# Patient Record
Sex: MALE | Race: WHITE | NOT HISPANIC OR LATINO | Employment: UNEMPLOYED | URBAN - METROPOLITAN AREA
[De-identification: names, ages, dates, MRNs, and addresses within clinical notes are randomized per-mention and may not be internally consistent; named-entity substitution may affect disease eponyms.]

---

## 2017-07-22 ENCOUNTER — HOSPITAL ENCOUNTER (EMERGENCY)
Facility: CLINIC | Age: 27
Discharge: HOME | End: 2017-07-23
Attending: EMERGENCY MEDICINE | Admitting: EMERGENCY MEDICINE
Payer: COMMERCIAL

## 2017-07-22 DIAGNOSIS — R19.7 DIARRHEA OF PRESUMED INFECTIOUS ORIGIN: ICD-10-CM

## 2017-07-22 DIAGNOSIS — K52.9 NON-SPECIFIC COLITIS: ICD-10-CM

## 2017-07-22 LAB
ANION GAP SERPL CALCULATED.3IONS-SCNC: 12 MMOL/L (ref 3–14)
BUN SERPL-MCNC: 7 MG/DL (ref 7–30)
CALCIUM SERPL-MCNC: 8.8 MG/DL (ref 8.5–10.1)
CHLORIDE SERPL-SCNC: 102 MMOL/L (ref 94–109)
CO2 SERPL-SCNC: 24 MMOL/L (ref 20–32)
CREAT SERPL-MCNC: 0.78 MG/DL (ref 0.66–1.25)
GFR SERPL CREATININE-BSD FRML MDRD: ABNORMAL ML/MIN/1.7M2
GLUCOSE SERPL-MCNC: 127 MG/DL (ref 70–99)
POTASSIUM SERPL-SCNC: 3.8 MMOL/L (ref 3.4–5.3)
SODIUM SERPL-SCNC: 138 MMOL/L (ref 133–144)

## 2017-07-22 PROCEDURE — 99283 EMERGENCY DEPT VISIT LOW MDM: CPT | Mod: 25 | Performed by: EMERGENCY MEDICINE

## 2017-07-22 PROCEDURE — 87506 IADNA-DNA/RNA PROBE TQ 6-11: CPT | Performed by: EMERGENCY MEDICINE

## 2017-07-22 PROCEDURE — 80048 BASIC METABOLIC PNL TOTAL CA: CPT | Performed by: FAMILY MEDICINE

## 2017-07-22 PROCEDURE — 96360 HYDRATION IV INFUSION INIT: CPT | Performed by: EMERGENCY MEDICINE

## 2017-07-22 PROCEDURE — 25000128 H RX IP 250 OP 636: Performed by: FAMILY MEDICINE

## 2017-07-22 PROCEDURE — 87493 C DIFF AMPLIFIED PROBE: CPT | Performed by: EMERGENCY MEDICINE

## 2017-07-22 PROCEDURE — 99282 EMERGENCY DEPT VISIT SF MDM: CPT | Mod: GC | Performed by: EMERGENCY MEDICINE

## 2017-07-22 RX ORDER — SODIUM CHLORIDE 9 MG/ML
1000 INJECTION, SOLUTION INTRAVENOUS CONTINUOUS
Status: DISCONTINUED | OUTPATIENT
Start: 2017-07-22 | End: 2017-07-23 | Stop reason: HOSPADM

## 2017-07-22 RX ADMIN — SODIUM CHLORIDE 1000 ML: 9 INJECTION, SOLUTION INTRAVENOUS at 23:30

## 2017-07-22 RX ADMIN — SODIUM CHLORIDE 1000 ML: 9 INJECTION, SOLUTION INTRAVENOUS at 22:37

## 2017-07-22 ASSESSMENT — ENCOUNTER SYMPTOMS
DIAPHORESIS: 1
VOMITING: 0
NECK STIFFNESS: 0
COLOR CHANGE: 0
BLOOD IN STOOL: 0
ABDOMINAL PAIN: 1
EYE REDNESS: 0
DIFFICULTY URINATING: 0
APPETITE CHANGE: 1
ARTHRALGIAS: 0
HEADACHES: 1
CHILLS: 1
NAUSEA: 0
CONFUSION: 0
DIARRHEA: 1
FEVER: 1
SHORTNESS OF BREATH: 0

## 2017-07-22 NOTE — ED AVS SNAPSHOT
[facility], Emergency Department    [address]    [place] [address]    Phone:  [phone]    Fax:  [phone]                                       Balaji Wolfe   MRN: [id number]    Department:  [facility], Emergency Department   Date of Visit:  7/22/2017           Patient Information     Date Of Birth          1990        Your diagnoses for this visit were:     Non-specific colitis        You were seen by Lyubov Valladares MD.        Discharge Instructions       Please make an appointment to follow up with Your Primary Care Provider in 3-5 days if not improving.     Discharge References/Attachments     DIARRHEA, TREATING (ENGLISH)      24 Hour Appointment Hotline       To make an appointment at any [facility] clinic, call [phone] ([phone]). If you don't have a family doctor or clinic, we will help you find one. [facility] clinics are conveniently located to serve the needs of you and your family.             Review of your medicines      Our records show that you are taking the medicines listed below. If these are incorrect, please call your family doctor or clinic.        Dose / Directions Last dose taken    ADVIL 200 MG capsule   Generic drug:  ibuprofen        Take  by mouth as needed.   Refills:  0        EPINEPHrine 0.3 MG/0.3ML injection 2-pack   Commonly known as:  EPIPEN 2-NAS   Dose:  0.3 mg   Quantity:  0.6 mL        Inject 0.3 mLs (0.3 mg) into the muscle once as needed for anaphylaxis   Refills:  11        minocycline 100 MG tablet   Commonly known as:  DYNACIN   Dose:  1 tablet        Take 1 tablet by mouth 2 times daily. Per dermatology   Refills:  0                Procedures and tests performed during your visit     Basic metabolic panel    Clostridium difficile toxin B PCR    Enteric Bacteria and Virus Panel by SABINE Stool      Orders Needing Specimen Collection     None      Pending Results     Date and Time Order Name Status Description    7/22/2017  2204 Clostridium difficile toxin B PCR In process     7/22/2017 2204 Enteric Bacteria and Virus Panel by SABINE Stool In process             Pending Culture Results     Date and Time Order Name Status Description    7/22/2017 2204 Clostridium difficile toxin B PCR In process     7/22/2017 2204 Enteric Bacteria and Virus Panel by SABINE Stool In process             Pending Results Instructions     If you had any lab results that were not finalized at the time of your Discharge, you can call the ED Lab Result RN at [phone]. You will be contacted by this team for any positive Lab results or changes in treatment. The nurses are available 7 days a week from 10A to 6:30P.  You can leave a message 24 hours per day and they will return your call.        Thank you for choosing [facility]       Thank you for choosing [facility] for your care. Our goal is always to provide you with excellent care. Hearing back from our patients is one way we can continue to improve our services. Please take a few minutes to complete the written survey that you may receive in the mail after you visit with us. Thank you!        [employer] Information     [employer] gives you secure access to your electronic health record. If you see a primary care provider, you can also send messages to your care team and make appointments. If you have questions, please call your primary care clinic.  If you do not have a primary care provider, please call [phone] and they will assist you.        Care EveryWhere ID     This is your Care EveryWhere ID. This could be used by other organizations to access your [facility] medical records  [id number]        Equal Access to Services     ERIKA IN AH: Hadii soledad Pagan, waaxda luqadaha, qaybta kaalmada mildred, jose alberto wong. So [facility] [phone].    ATENCIÓN: Si habla español, tiene a hay disposición servicios gratuitos de asistencia lingüística. Llame al [phone].    We comply  with applicable federal civil rights laws and [place] laws. We do not discriminate on the basis of race, color, national origin, age, disability sex, sexual orientation or gender identity.            After Visit Summary       This is your record. Keep this with you and show to your community pharmacist(s) and doctor(s) at your next visit.

## 2017-07-22 NOTE — ED AVS SNAPSHOT
[facility], [place], Emergency Department    [address]    [facility] [address]    Phone:  [phone]    Fax:  [phone]                                       Balaji Wolfe   MRN: [id number]    Department:  [facility], Emergency Department   Date of Visit:  7/22/2017           After Visit Summary Signature Page     I have received my discharge instructions, and my questions have been answered. I have discussed any challenges I see with this plan with the nurse or doctor.    ..........................................................................................................................................  Patient/Patient Representative Signature      ..........................................................................................................................................  Patient Representative Print Name and Relationship to Patient    ..................................................               ................................................  Date                                            Time    ..........................................................................................................................................  Reviewed by Signature/Title    ...................................................              ..............................................  Date                                                            Time

## 2017-07-23 ENCOUNTER — TELEPHONE (OUTPATIENT)
Dept: EMERGENCY MEDICINE | Facility: CLINIC | Age: 27
End: 2017-07-23

## 2017-07-23 VITALS
HEART RATE: 99 BPM | DIASTOLIC BLOOD PRESSURE: 76 MMHG | TEMPERATURE: 98.8 F | OXYGEN SATURATION: 100 % | RESPIRATION RATE: 18 BRPM | SYSTOLIC BLOOD PRESSURE: 134 MMHG | BODY MASS INDEX: 20.02 KG/M2 | WEIGHT: 157 LBS

## 2017-07-23 LAB
C DIFF TOX B STL QL: NORMAL
CAMPYLOBACTER GROUP BY NAT: NOT DETECTED
ENTERIC PATHOGEN COMMENT: ABNORMAL
NOROVIRUS I AND II BY NAT: NOT DETECTED
ROTAVIRUS A BY NAT: NOT DETECTED
SALMONELLA SPECIES BY NAT: ABNORMAL
SHIGA TOXIN 1 GENE BY NAT: NOT DETECTED
SHIGA TOXIN 2 GENE BY NAT: NOT DETECTED
SHIGELLA SP+EIEC IPAH STL QL NAA+PROBE: NOT DETECTED
SPECIMEN SOURCE: NORMAL
VIBRIO GROUP BY NAT: NOT DETECTED
YERSINIA ENTEROCOLITICA BY NAT: NOT DETECTED

## 2017-07-23 NOTE — ED NOTES
Patient has been febrile since yesterday.  Also, he has severe LLQ pain and headache along with dizziness with positional changes.

## 2017-07-23 NOTE — DISCHARGE INSTRUCTIONS
Please make an appointment to follow up with Your Primary Care Provider in 3-5 days if not improving.

## 2017-07-23 NOTE — ED PROVIDER NOTES
"  History     Chief Complaint   Patient presents with     Fever     currently fever is 100.5 but patient did take 200mg of ibuprofen around 3pm today     Abdominal Pain     sharp abdominal pain which started yesterday - LLQ     Dizziness     with positional changes.     HPI  Balaji Wolfe is a 26 year old male who presents for 2 days of abdominal pain, fever, headache, and diarrhea. The patient describes his abdominal pain as a 6-7/10 cramping pain localized to the left lower quadrant that comes in waves. His diarrhea has not had any blood. He reports chills and sweats at home with a temp of 100.5 here in the ED. The patient has not had any cough, shortness of breath, nausea, or vomiting. He has had a decreased PO intake secondary to decreased appetite but is attempting to keep up on fluids. He feels lightheaded when he stands and has a mild diffuse headache. The patient denies any ill contacts and has never had a similar illness.     I have reviewed the Medications, Allergies, Past Medical and Surgical History, and Social History in the [facility] system.  Past Medical History:   Diagnosis Date     Attn deficit w/ hyperact        Past Surgical History:   Procedure Laterality Date     NOSE SURGERY      nasal fracture       Family History   Problem Relation Age of Onset     Lipids Father      Family History Negative Mother      Family History Negative Brother        Social History   Substance Use Topics     Smoking status: Current Some Day Smoker     Types: Cigarettes     Smokeless tobacco: Never Used      Comment: \"occasional\" smoker (~ 1 pack every 1-1.5 weeks)     Alcohol use 0.0 oz/week     0 Standard drinks or equivalent per week      Comment: social beer         Review of Systems   Constitutional: Positive for appetite change, chills, diaphoresis and fever.   HENT: Negative for congestion.    Eyes: Negative for redness.   Respiratory: Negative for shortness of breath.    Cardiovascular: Negative for chest pain. "   Gastrointestinal: Positive for abdominal pain and diarrhea. Negative for blood in stool, nausea and vomiting.   Genitourinary: Negative for difficulty urinating.   Musculoskeletal: Negative for arthralgias and neck stiffness.   Skin: Negative for color change.   Neurological: Positive for headaches.   Psychiatric/Behavioral: Negative for confusion.   All other systems reviewed and are negative.      Physical Exam   BP: 135/87  Pulse: 99  Temp: 100.5  F (38.1  C)  Resp: 18  Weight: 71.2 kg (157 lb)  SpO2: 99 %  Physical Exam   Constitutional: He is oriented to person, place, and time. He appears well-developed and well-nourished. No distress.   HENT:   Head: Normocephalic and atraumatic.   Mouth/Throat: Oropharynx is clear and moist. No oropharyngeal exudate.   Eyes: Pupils are equal, round, and reactive to light. No scleral icterus.   Neck: Normal range of motion.   Cardiovascular: Normal rate, regular rhythm, normal heart sounds and intact distal pulses.    Pulmonary/Chest: Effort normal and breath sounds normal. No respiratory distress. He has no rales.   Abdominal: Soft. Bowel sounds are normal. There is tenderness (mild LLQ). There is no rebound and no guarding.   Musculoskeletal: Normal range of motion. He exhibits no edema or tenderness.   Neurological: He is alert and oriented to person, place, and time. No cranial nerve deficit.   Skin: Skin is warm and dry. No rash noted. He is not diaphoretic.   Psychiatric: He has a normal mood and affect. His behavior is normal.   Nursing note and vitals reviewed.      ED Course     ED Course   10:15 PM patient discussed with Dr Valladares who also saw and examined the patient.     Procedures         Critical Care time:  none     Labs Ordered and Resulted from Time of ED Arrival Up to the Time of Departure from the ED   BASIC METABOLIC PANEL - Abnormal; Notable for the following:        Result Value    Glucose 127 (*)     All other components within normal limits   ENTERIC  BACTERIA AND VIRUS PANEL BY SABINE STOOL   CLOSTRIDIUM DIFFICILE TOXIN B            Assessments & Plan (with Medical Decision Making)   Balaji Wofle is a 26 year old male who presents for 2 days of abdominal pain, fever, headache, and diarrhea. DDx includes c diff colitis, other infectious colitis, and less likely kidney stone. Diverticular disease unlikely given age. Exam was benign and no concern for perforation or other emergent intraabdominal pathology. No hematochezia concerning for enterohemorrhage e coli or other hemorrhagic infection. Patient does not have any risk factors for c diff making that unlikely. Suspect this is a viral or bacterial colitis that will be self limited. Given the symptoms of lightheadedness on standing we checked electrolytes and gave 1L NS. Stool bacteria and virus panel was sent to facilitate follow up if this does not resolve. Patient instructed as to symptomatic treatment and reasons to return to the ED.      I have reviewed the nursing notes.    I have reviewed the findings, diagnosis, plan and need for follow up with the patient.    Discharge Medication List as of 7/22/2017 11:58 PM          Final diagnoses:   Non-specific colitis   Diarrhea of presumed infectious origin     Jin Hitchcock MD   Family Medicine       7/22/2017   [facility], EMERGENCY DEPARTMENT    Attending addendum:    This data collected with the Resident working in the Emergency Department.  Patient was seen and evaluated by myself and I repeated the history and physical exam with the patient.  The plan of care was discussed with them.  The key portions of the note including the entire assessment and plan reflect my documentation.       Patient denies recent hospital admission, recent travel or camping or drinking water from rivers or streams. Denies known exposure or recent antibiotic use.      My exam:  GEN:  Alert, well developed, no acute distress  HEENT:  PERRL, EOMI, Mucous membranes are moist.    Cardio:  RRR, no murmur, radial pulses equal bilaterally  PULM:  Lungs clear, good air movement, no wheezes, rales   Abd:  Soft, normal bowel sounds, no focal tenderness, no percussion tenderness.   Back exam:  No CVA tenderness  Musculoskeletal:  normal range of motion, no lower extremity swelling or calf tenderness  Neuro:  Alert and oriented X3, Follows commands, moving all extremities spontaneously   Skin:  Warm, dry     After he was given IV fluids, he was feeling better, repeat temp was normal. Exam is benign and I do not suspect acute surgical abdomen.  He is tolerating oral fluids and can hydrate further at home. He was discharged to home and was advised to follow up with his PCP if no improvement and to return to the ED if worsening symptoms. Stool testing for C.diff and enteric pathogens is pending. He will be contacted if positive findings.      Lyubov Valladares MD  07/23/17 0029

## 2017-07-23 NOTE — TELEPHONE ENCOUNTER
[facility]  Emergency Department Lab result notification:    [facility] ED lab result protocol used  Enteric bacteria and virus panel    Reason for call  Notify of lab results, assess symptoms,  review ED providers recommendations/discharge instructions (if necessary) and advise per ED lab result f/u protocol    Lab Result  Final Enteric Bacteria and Virus Panel by SABINE Stool is POSITIVE for Salmonella  Patient to be notified, symptom's assessed and advised per [facility] ED lab result f/u protocol.    Information table from ED Provider visit on 7/22/17  Symptoms reported at ED visit (Chief complaint, HPI) Balaji Wolfe is a 26 year old male who presents for 2 days of abdominal pain, fever, headache, and diarrhea. The patient describes his abdominal pain as a 6-7/10 cramping pain localized to the left lower quadrant that comes in waves. His diarrhea has not had any blood. He reports chills and sweats at home with a temp of 100.5 here in the ED. The patient has not had any cough, shortness of breath, nausea, or vomiting. He has had a decreased PO intake secondary to decreased appetite but is attempting to keep up on fluids. He feels lightheaded when he stands and has a mild diffuse headache. The patient denies any ill contacts and has never had a similar illness.    ED providers Impression and Plan (applicable information) Balaji Wolfe is a 26 year old male who presents for 2 days of abdominal pain, fever, headache, and diarrhea. DDx includes c diff colitis, other infectious colitis, and less likely kidney stone. Diverticular disease unlikely given age. Exam was benign and no concern for perforation or other emergent intraabdominal pathology. No hematochezia concerning for enterohemorrhage e coli or other hemorrhagic infection. Patient does not have any risk factors for c diff making that unlikely. Suspect this is a viral or bacterial colitis that will be self limited. Given the symptoms of  "lightheadedness on standing we checked electrolytes and gave 1L NS. Stool bacteria and virus panel was sent to facilitate follow up if this does not resolve. Patient instructed as to symptomatic treatment and reasons to return to the ED.        Miscellaneous information C diff negative     RN Assessment (Patient s current Symptoms), include time called.  [Insert Left message here if message left]  Msg left at 11:23 am.  Upon return phone call, Steven reports he is feeling \"better than yesterday\", today has no fever today.  Has some pain , coming in \"waves\".  Did review basic information including infection control measures and home care related to Salmonella.  Today day 3 of illness.  Has no questions.    Please Contact your PCP clinic or return to the Emergency department if your:    Symptoms return.    Symptoms worsen or other concerning symptom's.    PCP follow-up Questions asked: NO    Risa Mercer, ROBERTO    [facility] RN  Lung Nodule and ED Lab Results F/U RN  Epic pool (ED late result f/u RN) : [id number]  Ph # [phone]    Copy of Lab result   Order   Enteric Bacteria and Virus Panel by SABINE Stool [[id number]] (Order [id number])   Exam Information   Exam Date Exam Time Accession # Results    7/22/17 11:29 PM [id number]    Component Results   Component Value Flag Ref Range Units Status Collected Lab   Campylobacter group by SABINE Not Detected  NDET  Final 07/22/2017 11:29 PM 75   Salmonella species by SABINE  (A) NDET  Final 07/22/2017 11:29 PM 75   Detected, Abnormal Result   Shigella species by SABINE Not Detected  NDET  Final 07/22/2017 11:29 PM 75   Vibrio group by SABINE Not Detected  NDET  Final 07/22/2017 11:29 PM 75   Rotavirus A by SABINE Not Detected  NDET  Final 07/22/2017 11:29 PM 75   Shiga toxin 1 gene by SABINE Not Detected  NDET  Final 07/22/2017 11:29 PM 75   Shiga toxin 2 gene by SABINE Not Detected  NDET  Final 07/22/2017 11:29 PM 75   Norovirus I and II by SABINE Not Detected  NDET  Final 07/22/2017 " 11:29 PM 75   Yersinia enterocolitica by SABINE Not Detected  NDET  Final 07/22/2017 11:29 PM 75   Enteric pathogen comment     Final 07/22/2017 11:29 PM 75

## 2018-09-06 ENCOUNTER — OFFICE VISIT (OUTPATIENT)
Dept: PODIATRY | Facility: CLINIC | Age: 28
End: 2018-09-06
Payer: COMMERCIAL

## 2018-09-06 VITALS — WEIGHT: 160 LBS | HEIGHT: 74 IN | RESPIRATION RATE: 18 BRPM | BODY MASS INDEX: 20.53 KG/M2

## 2018-09-06 DIAGNOSIS — L60.0 INGROWING NAIL: ICD-10-CM

## 2018-09-06 DIAGNOSIS — M79.674 PAIN OF TOE OF RIGHT FOOT: Primary | ICD-10-CM

## 2018-09-06 PROCEDURE — 99203 OFFICE O/P NEW LOW 30 MIN: CPT | Mod: 25 | Performed by: PODIATRIST

## 2018-09-06 PROCEDURE — 11730 AVULSION NAIL PLATE SIMPLE 1: CPT | Performed by: PODIATRIST

## 2018-09-06 NOTE — LETTER
"    9/6/2018         RE: Balaji Wolfe  [address] [place] [address]        Dear Colleague,    Thank you for referring your patient, Balaji Wolfe, to the [facility]. Please see a copy of my visit note below.      ASSESSMENT/PLAN:    Encounter Diagnoses   Name Primary?     Pain of toe of right foot Yes     Ingrowing nail, lateral edge, right hallux      The potential causes and nature of an ingrown toenail were discussed with the patient.  We reviewed the natural history/prognosis of the condition and potential risks if no treatment is provided.      Treatment options discussed included conservative management (oral antibiotics, soaking of foot, adequate width shoes)  as well as surgical management (partial or total nail removal).  The pros and cons of both forms of treatment were reviewed.     Partial permanent removal was also discussed.      After thorough discussion and answering all questions, the patient elected to a partial nail avulsion, lateral edge, right hallux.    Nail Avulsion Procedure  (non permanent removal)    The procedure was discussed with the patient, including risk of infection, abnormal nail regrowth, and possible need for a future nail procedure.  Post procedure home cares were explained. These cares are important for preventing infection and aiding in timely healing.   Verbal and written consent was obtained.   The site was marked and the \"Time Out\" called.     The base of the right hallux  was injected with 2 cc of  2% Lidocaine plain.  The toe was then prepped with betadine solution.  A tourniquet was applied around the base of the toe for hemostasis.   Next the toe was checked for adequate anesthesia.  With the Pt comfortable, the lateral nail was freed from the nail bed and marginal soft tissue attachments with a blunt instrument.  ( A nail splitter was then used to make a longitudinal cut 2mm from the lateral nail fold.  It was completed, " atraumatically, under the eponychium with a [place] blade. ) Next, it was firmly grasped with a hemostat and removed in total.      Silvadene ointment was applied to the nail bed, followed by a compressive dressing.  The tourniquet was removed.  The distal toe turned immediately pink.  The foot was kept elevated for several minutes.  The patient tolerated the procedure well.      Patient is instructed to watch for, and call if,  increasing redness, drainage, and pain after 2-3 days.  Post procedure instructions provided - handout given.    Body mass index is 20.4 kg/(m^2).          Gonzalez Barlow DPM, FACFAS, MS    [facility] of Podiatry/Foot & Ankle Surgery      ____________________________________________________________________    HPI:         Chief Complaint: ingrown toenail, right great toe, lateral edge;  Toe pain  Onset of problem: 3 months  Pain/ discomfort is described as:  Throbbing at times, some shooting on occasion  Ratin/10   Frequency:  daily    The pain is made worse with pressure on toe  Previous treatment: soaking, neosporin  He associates the problem with a pair of bike shoe that he thinks were too tight.   *  Patient Active Problem List   Diagnosis     Tree nut allergy   *  *  Past Surgical History:   Procedure Laterality Date     NOSE SURGERY      nasal fracture   *  *  Current Outpatient Prescriptions   Medication Sig Dispense Refill     EPINEPHrine (EPIPEN 2-NAS) 0.3 MG/0.3ML injection Inject 0.3 mLs (0.3 mg) into the muscle once as needed for anaphylaxis 0.6 mL 11     ibuprofen (ADVIL) 200 MG capsule Take  by mouth as needed.       minocycline (DYNACIN) 100 MG tablet Take 1 tablet by mouth 2 times daily. Per dermatology         ROS:     A 10-point review of systems was performed and is positive for that noted above in the HPI and as seen below.  All other areas are negative.     Numbness in feet?  no   Calf pain with walking? no  Recent foot/ankle injury? no  Weight change over  "past 12 months? loss  Self perception as overweight? no  Recent flu-like symptoms? no  Joint pain other than feet ? no    Social History: Employment:  yes;  Exercise/Physical activity:  no;  Tobacco use:  yes  Social History     Social History     Marital status: Single     Spouse name: N/A     Number of children: 0     Years of education: N/A     Occupational History     front office      [employer]     Social History Main Topics     Smoking status: Current Some Day Smoker     Types: Cigarettes     Smokeless tobacco: Never Used      Comment: \"occasional\" smoker (~ 1 pack every 1-1.5 weeks)     Alcohol use 0.0 oz/week     0 Standard drinks or equivalent per week      Comment: social beer     Drug use: No     Sexual activity: No     Other Topics Concern     Parent/Sibling W/ Cabg, Mi Or Angioplasty Before 65f 55m? No     Social History Narrative       Family history:  Family History   Problem Relation Age of Onset     Lipids Father      Family History Negative Mother      Family History Negative Brother        Rheumatoid arthritis:  no  Foot Problems: no  Diabetes: no      EXAM:    Vitals: Resp 18  Ht 6' 2.25\" (1.886 m)  Wt 160 lb (72.6 kg)  BMI 20.4 kg/m2  BMI: Body mass index is 20.4 kg/(m^2).  Height: 6' 2.25\"    Constitutional/ general:  Pt is in no apparent distress, appears well-nourished.  Cooperative with history and physical exam.     Vascular:  Pedal pulses are palpable bilaterally for both the DP and PT arteries.  CFT < 3 sec.  No edema.  Pedal hair growth noted.     Neuro:  Alert and oriented x 3. Coordinated gait.  Light touch sensation is intact to the L4, L5, S1 distributions. No obvious deficits.  No evidence of neurological-based weakness, spasticity, or contracture in the lower extremities.     Derm: erythema, edema, tenderness along the lateral nail unit, right hallux. The nail is mildly thickened and discolored.     Musculoskeletal:    Lower extremity muscle strength is normal.  Patient is " ambulatory without an assistive device or brace .  No gross deformities.      Gonzalez Barlow DPM, FACFAS, MS    [facility] of Podiatry/Foot & Ankle Surgery                Again, thank you for allowing me to participate in the care of your patient.        Sincerely,        Gonzalez Barlow DPM

## 2018-09-06 NOTE — MR AVS SNAPSHOT
After Visit Summary   9/6/2018    Balaji Wolfe    MRN: [id number]           Patient Information     Date Of Birth          1990        Visit Information        Provider Department      9/6/2018 1:15 PM Gonzalez Frank DPM [facility]        Today's Diagnoses     Pain of toe of right foot    -  1    Ingrowing nail, lateral edge, right hallux          Care Instructions    Thank you for choosing [facility]!    DR. FRANK'S CLINIC LOCATIONS     MONDAY - [place] WEDNESDAY - [place]   [address] [address] [place] [address]   [phone] / [phone] / [phone]       THURSDAY - [place] SCHEDULE SURGERY: [phone]   [address] APPOINTMENTS: [phone]   [place] [address] BILLING QUESTIONS: [phone] / [phone]       [facility]  1. Keep bandage on until that evening or the day after your procedure. If the bandage falls off, start the soaking process.    2. Some bleeding is normal. If bleeding seems excessive to you, place ice on top of your foot for 15-20 minutes and elevate your foot above heart level.    3. Over the counter pain medication, elevating your foot and ice application is all you will need for pain control.    4. If the bandage feels too tight and your toe is throbbing it is ok to remove the bandage and start soaking.     5. For two weeks, soak your foot twice a day in mild skin friendly soap (dish or hand soap) and warm water for 15 minutes. It is ok to soak your foot for a few minutes to loosen the dressing applied in the clinic. After soaking, blot dry and apply a regular band aid.    6. It is normal to experience some discomfort and redness around the nail for several days following the procedure. Drainage will likely appear a red- yellow. This is normal. If your toe is still draining a red-yellow fluid  after 2 weeks continue to soak foot.    7. Initial discomfort might last for 2-3 days. You may resume with regular activity as soon as you are comfortable, as long as you keep the wound clean and dry and follow the soaking instruction. It is recommended that you do not enter public swimming pools/hot tubs while your toe is draining.    8. If you are experiencing worsening pain and redness or notice pus after 2-3 days please contact the clinic. If it is after hours call the clinic number and follow the voice prompter. This will direct you to an on call doctor.      Body Mass Index (BMI)  Many things can cause foot and ankle problems. Foot structure, activity level, foot mechanics and injuries are common causes of pain.  One very important issue that often goes unmentioned, is body weight.  Extra weight can cause increased stress on muscles, ligaments, bones and tendons.  Sometimes just a few extra pounds is all it takes to put one over her/his threshold. Without reducing that stress, it can be difficult to alleviate pain. Some people are uncomfortable addressing this issue, but we feel it is important for you to think about it. As Foot &  Ankle specialists, our job is addressing the lower extremity problem and possible causes. Regarding extra body weight, we encourage patients to discuss diet and weight management plans with their primary care doctors. It is this team approach that gives you the best opportunity for pain relief and getting you back on your feet.              Follow-ups after your visit        Who to contact     If you have questions or need follow up information about today's clinic visit or your schedule please contact [facility] directly at [phone].  Normal or non-critical lab and imaging results will be communicated to you by MyChart, letter or phone within 4 business days after the clinic has received the results. If you do not hear from us within 7 days, please contact the  "clinic through [employer] or phone. If you have a critical or abnormal lab result, we will notify you by phone as soon as possible.  Submit refill requests through [email or web address] or call your pharmacy and they will forward the refill request to us. Please allow 3 business days for your refill to be completed.          Additional Information About Your Visit        Wire[employer] Information     [email or web address] gives you secure access to your electronic health record. If you see a primary care provider, you can also send messages to your care team and make appointments. If you have questions, please call your primary care clinic.  If you do not have a primary care provider, please call [phone] and they will assist you.        Care EveryWhere ID     This is your Care EveryWhere ID. This could be used by other organizations to access your [facility] medical records  [id number]        Your Vitals Were     Respirations Height BMI (Body Mass Index)             18 6' 2.25\" (1.886 m) 20.4 kg/m2          Blood Pressure from Last 3 Encounters:   07/23/17 134/76   12/09/16 114/78   12/11/15 116/72    Weight from Last 3 Encounters:   09/06/18 160 lb (72.6 kg)   07/22/17 157 lb (71.2 kg)   12/09/16 164 lb 14.4 oz (74.8 kg)              We Performed the Following     REMOVAL OF NAIL PLATE SIMPLE SINGLE        Primary Care Provider Office Phone # Fax #    Jamar Flores [phone]       [address] [place] [phone]        Equal Access to Services     ERIKA NI AH: Hadii aad ku hadasho Soomaali, waaxda luqadaha, qaybta kaalmada adeegyada, waxay shadia hardwick . So [facility] [phone].    ATENCIÓN: Si habla español, tiene a hay disposición servicios gratuitos de asistencia lingüística. Llame al [phone].    We comply with applicable federal civil rights laws and [place] laws. We do not discriminate on the basis of race, color, national origin, age, disability, sex, sexual orientation, or gender " identity.            Thank you!     Thank you for choosing [facility]  for your care. Our goal is always to provide you with excellent care. Hearing back from our patients is one way we can continue to improve our services. Please take a few minutes to complete the written survey that you may receive in the mail after your visit with us. Thank you!             Your Updated Medication List - Protect others around you: Learn how to safely use, store and throw away your medicines at www.disposemymeds.org.          This list is accurate as of 9/6/18  1:18 PM.  Always use your most recent med list.                   Brand Name Dispense Instructions for use Diagnosis    ADVIL 200 MG capsule   Generic drug:  ibuprofen      Take  by mouth as needed.        EPINEPHrine 0.3 MG/0.3ML injection 2-pack    EPIPEN 2-NAS    0.6 mL    Inject 0.3 mLs (0.3 mg) into the muscle once as needed for anaphylaxis    Tree nut allergy       minocycline 100 MG tablet    DYNACIN     Take 1 tablet by mouth 2 times daily. Per dermatology

## 2018-09-06 NOTE — PROGRESS NOTES
"  ASSESSMENT/PLAN:    Encounter Diagnoses   Name Primary?     Pain of toe of right foot Yes     Ingrowing nail, lateral edge, right hallux      The potential causes and nature of an ingrown toenail were discussed with the patient.  We reviewed the natural history/prognosis of the condition and potential risks if no treatment is provided.      Treatment options discussed included conservative management (oral antibiotics, soaking of foot, adequate width shoes)  as well as surgical management (partial or total nail removal).  The pros and cons of both forms of treatment were reviewed.     Partial permanent removal was also discussed.      After thorough discussion and answering all questions, the patient elected to a partial nail avulsion, lateral edge, right hallux.    Nail Avulsion Procedure  (non permanent removal)    The procedure was discussed with the patient, including risk of infection, abnormal nail regrowth, and possible need for a future nail procedure.  Post procedure home cares were explained. These cares are important for preventing infection and aiding in timely healing.   Verbal and written consent was obtained.   The site was marked and the \"Time Out\" called.     The base of the right hallux  was injected with 2 cc of  2% Lidocaine plain.  The toe was then prepped with betadine solution.  A tourniquet was applied around the base of the toe for hemostasis.   Next the toe was checked for adequate anesthesia.  With the Pt comfortable, the lateral nail was freed from the nail bed and marginal soft tissue attachments with a blunt instrument.  ( A nail splitter was then used to make a longitudinal cut 2mm from the lateral nail fold.  It was completed, atraumatically, under the eponychium with a [place] blade. ) Next, it was firmly grasped with a hemostat and removed in total.      Silvadene ointment was applied to the nail bed, followed by a compressive dressing.  The tourniquet was removed.  The distal toe " turned immediately pink.  The foot was kept elevated for several minutes.  The patient tolerated the procedure well.      Patient is instructed to watch for, and call if,  increasing redness, drainage, and pain after 2-3 days.  Post procedure instructions provided - handout given.    Body mass index is 20.4 kg/(m^2).          Gonzalez Barlow DPM, FACFAS, MS [facility] Department of Podiatry/Foot & Ankle Surgery      ____________________________________________________________________    HPI:         Chief Complaint: ingrown toenail, right great toe, lateral edge;  Toe pain  Onset of problem: 3 months  Pain/ discomfort is described as:  Throbbing at times, some shooting on occasion  Ratin/10   Frequency:  daily    The pain is made worse with pressure on toe  Previous treatment: soaking, neosporin  He associates the problem with a pair of bike shoe that he thinks were too tight.   *  Patient Active Problem List   Diagnosis     Tree nut allergy   *  *  Past Surgical History:   Procedure Laterality Date     NOSE SURGERY      nasal fracture   *  *  Current Outpatient Prescriptions   Medication Sig Dispense Refill     EPINEPHrine (EPIPEN 2-NAS) 0.3 MG/0.3ML injection Inject 0.3 mLs (0.3 mg) into the muscle once as needed for anaphylaxis 0.6 mL 11     ibuprofen (ADVIL) 200 MG capsule Take  by mouth as needed.       minocycline (DYNACIN) 100 MG tablet Take 1 tablet by mouth 2 times daily. Per dermatology         ROS:     A 10-point review of systems was performed and is positive for that noted above in the HPI and as seen below.  All other areas are negative.     Numbness in feet?  no   Calf pain with walking? no  Recent foot/ankle injury? no  Weight change over past 12 months? loss  Self perception as overweight? no  Recent flu-like symptoms? no  Joint pain other than feet ? no    Social History: Employment:  yes;  Exercise/Physical activity:  no;  Tobacco use:  yes  Social History     Social History     Marital  "status: Single     Spouse name: N/A     Number of children: 0     Years of education: N/A     Occupational History     front office      [employer]     Social History Main Topics     Smoking status: Current Some Day Smoker     Types: Cigarettes     Smokeless tobacco: Never Used      Comment: \"occasional\" smoker (~ 1 pack every 1-1.5 weeks)     Alcohol use 0.0 oz/week     0 Standard drinks or equivalent per week      Comment: social beer     Drug use: No     Sexual activity: No     Other Topics Concern     Parent/Sibling W/ Cabg, Mi Or Angioplasty Before 65f 55m? No     Social History Narrative       Family history:  Family History   Problem Relation Age of Onset     Lipids Father      Family History Negative Mother      Family History Negative Brother        Rheumatoid arthritis:  no  Foot Problems: no  Diabetes: no      EXAM:    Vitals: Resp 18  Ht 6' 2.25\" (1.886 m)  Wt 160 lb (72.6 kg)  BMI 20.4 kg/m2  BMI: Body mass index is 20.4 kg/(m^2).  Height: 6' 2.25\"    Constitutional/ general:  Pt is in no apparent distress, appears well-nourished.  Cooperative with history and physical exam.     Vascular:  Pedal pulses are palpable bilaterally for both the DP and PT arteries.  CFT < 3 sec.  No edema.  Pedal hair growth noted.     Neuro:  Alert and oriented x 3. Coordinated gait.  Light touch sensation is intact to the L4, L5, S1 distributions. No obvious deficits.  No evidence of neurological-based weakness, spasticity, or contracture in the lower extremities.     Derm: erythema, edema, tenderness along the lateral nail unit, right hallux. The nail is mildly thickened and discolored.     Musculoskeletal:    Lower extremity muscle strength is normal.  Patient is ambulatory without an assistive device or brace .  No gross deformities.      Gonzalez Barlow DPM, FACFAS, MS    [facility] of Pod[facility]ry/Foot & Ankle Surgery              "

## 2018-09-06 NOTE — PATIENT INSTRUCTIONS
Thank you for choosing [facility]!    DR. FRANK'S CLINIC LOCATIONS     MONDAY - [place] WEDNESDAY - [place]   [address] [address] [place] [address]   [phone] / [phone]       THURSDAY - [place] SCHEDULE SURGERY: [phone]   [address] APPOINTMENTS: [phone]   [place] [address] BILLING QUESTIONS: [phone] / [phone]       [facility]  1. Keep bandage on until that evening or the day after your procedure. If the bandage falls off, start the soaking process.    2. Some bleeding is normal. If bleeding seems excessive to you, place ice on top of your foot for 15-20 minutes and elevate your foot above heart level.    3. Over the counter pain medication, elevating your foot and ice application is all you will need for pain control.    4. If the bandage feels too tight and your toe is throbbing it is ok to remove the bandage and start soaking.     5. For two weeks, soak your foot twice a day in mild skin friendly soap (dish or hand soap) and warm water for 15 minutes. It is ok to soak your foot for a few minutes to loosen the dressing applied in the clinic. After soaking, blot dry and apply a regular band aid.    6. It is normal to experience some discomfort and redness around the nail for several days following the procedure. Drainage will likely appear a red- yellow. This is normal. If your toe is still draining a red-yellow fluid after 2 weeks continue to soak foot.    7. Initial discomfort might last for 2-3 days. You may resume with regular activity as soon as you are comfortable, as long as you keep the wound clean and dry and follow the soaking instruction. It is recommended that you do not enter public swimming pools/hot tubs while your toe is draining.    8. If you are experiencing worsening pain and redness or notice pus after 2-3  days please contact the clinic. If it is after hours call the clinic number and follow the voice prompter. This will direct you to an on call doctor.      Body Mass Index (BMI)  Many things can cause foot and ankle problems. Foot structure, activity level, foot mechanics and injuries are common causes of pain.  One very important issue that often goes unmentioned, is body weight.  Extra weight can cause increased stress on muscles, ligaments, bones and tendons.  Sometimes just a few extra pounds is all it takes to put one over her/his threshold. Without reducing that stress, it can be difficult to alleviate pain. Some people are uncomfortable addressing this issue, but we feel it is important for you to think about it. As Foot &  Ankle specialists, our job is addressing the lower extremity problem and possible causes. Regarding extra body weight, we encourage patients to discuss diet and weight management plans with their primary care doctors. It is this team approach that gives you the best opportunity for pain relief and getting you back on your feet.

## 2019-06-20 ENCOUNTER — OFFICE VISIT (OUTPATIENT)
Dept: PODIATRY | Facility: CLINIC | Age: 29
End: 2019-06-20
Payer: COMMERCIAL

## 2019-06-20 VITALS — HEIGHT: 74 IN | RESPIRATION RATE: 12 BRPM | WEIGHT: 160 LBS | BODY MASS INDEX: 20.53 KG/M2

## 2019-06-20 DIAGNOSIS — L60.0 INGROWING NAIL: ICD-10-CM

## 2019-06-20 DIAGNOSIS — M79.674 TOE PAIN, RIGHT: Primary | ICD-10-CM

## 2019-06-20 PROCEDURE — 11730 AVULSION NAIL PLATE SIMPLE 1: CPT | Mod: T5 | Performed by: PODIATRIST

## 2019-06-20 ASSESSMENT — MIFFLIN-ST. JEOR: SCORE: 1779.48

## 2019-06-20 NOTE — PATIENT INSTRUCTIONS
Thank you for choosing [facility]!    DR. FRANK'S CLINIC LOCATIONS     MONDAY - [place] WEDNESDAY (AM ONLY) - [place]   [address] [place] [address] [place] [address]   [phone] / [phone]       THURSDAY - [place] SCHEDULE SURGERY: [phone]   [address] APPOINTMENTS: [phone]   [place] [address] BILLING QUESTIONS: [phone]       [facility]  1. Keep bandage on until that evening or the day after your procedure. If the bandage falls off, start the soaking process.    2. Some bleeding is normal. If bleeding seems excessive to you, place ice on top of your foot for 15-20 minutes and elevate your foot above heart level.    3. Over the counter pain medication (tylenol / ibuprofen), elevating your foot and ice application is all you will need for pain control.    4. If the bandage feels too tight and your toe is throbbing it is ok to remove the bandage and start soaking.     5. For one to two weeks, soak your foot twice a day in mild skin friendly soap (dish or hand soap) and warm water for 15 minutes. It is ok to soak your foot for a few minutes to loosen the dressing applied in the clinic. After soaking, blot dry and apply a regular band aid.    6. It is normal to experience some discomfort and redness around the nail for several days following the procedure. Drainage will likely appear a red - yellow. This is normal. If your toe is still draining a red - yellow fluid after 2 weeks keep continuing to soak foot another few days.    7. Initial discomfort might last for 2-3 days. You may resume with regular activity as soon as you are comfortable, as long as you keep the wound clean and dry and follow the soaking instruction. It is recommended that you do not enter public swimming pools/hot tubs while your toe is draining.    8. If you are  experiencing worsening pain and redness or notice pus after 2-3 days please contact the clinic. Ask to speak with a triage nurse and they will inform our team of your symptoms and we can advise if a follow up is needed.

## 2019-06-20 NOTE — PROGRESS NOTES
"ASSESSMENT/PLAN:    Encounter Diagnoses   Name Primary?     Toe pain, right Yes     Ingrowing nail, lateral edge, right hallux      The potential causes and nature of an ingrown toenail were discussed with the patient.  We reviewed the natural history/prognosis of the condition and potential risks if no treatment is provided.      Treatment options discussed included conservative management (oral antibiotics when coexisting infection, soaking of foot, the use of a toe spacer, adequate width shoes)  as well as surgical management (partial or total nail removal).  The pros and cons of both forms of treatment were reviewed.      After thorough discussion and answering all questions, the patient elected to a partial nail avulsion.  I discussed the option of permanent removal, if a recurrent problem and when not infected. I explained that applying the chemical (phenol) creates a chemical burn, kills tissue and this is not ideal when there is an infection.      Nail Avulsion Procedure  (non permanent removal)    The procedure was discussed with the patient, including risk of infection, abnormal nail regrowth, and possible need for a future nail procedure.  Post procedure home cares were explained. These cares are important for preventing infection and aiding in timely healing.   Verbal and written consent was obtained.   The site was marked and the \"Time Out\" called.     The base of the right hallux was injected with 2 cc of  2% Lidocaine plain.  The toe was then prepped with betadine solution.  A tourniquet was applied around the base of the toe for hemostasis.   Next the toe was checked for adequate anesthesia.  With the Pt comfortable, the lateral nail was freed from the nail bed and marginal soft tissue attachments with a blunt instrument.  ( A nail splitter was then used to make a longitudinal cut 2mm from the lateral nail fold.  It was completed, atraumatically, under the eponychium with a [place] blade. ) Next, it " was firmly grasped with a hemostat and removed in total.      Silvadene ointment was applied to the nail bed, followed by a compressive dressing.  The tourniquet was removed.  The distal toe turned immediately pink.  The foot was kept elevated for several minutes.  The patient tolerated the procedure well.      Patient is instructed to watch for, and call if,  increasing redness, drainage, and pain after 2-3 days.  Post procedure instructions provided - handout given.      Body mass index is 20.4 kg/m .      Gonzalez Barlow DPM, FACFAS, MS    [facility] of Podiatry/Foot & Ankle Surgery      ____________________________________________________________________    HPI:         Chief Complaint: right great toe pain; ingrown nail, right great toe  Onset of problem: 9 days  Pain/ discomfort is described as:  Dep ache when pressure is applied  Pain Ratin/10 at worst.  Frequency:  daily    The pain is exacerbated by pressure, socks, bumping, running.  Previous treatment: soaking, tea tree oil, Advil  He had a partial nail avulsion last September for this same problem    Patient Active Problem List   Diagnosis     Tree nut allergy     Past Surgical History:   Procedure Laterality Date     NOSE SURGERY      nasal fracture     Current Outpatient Medications   Medication Sig Dispense Refill     EPINEPHrine (EPIPEN 2-NAS) 0.3 MG/0.3ML injection Inject 0.3 mLs (0.3 mg) into the muscle once as needed for anaphylaxis 0.6 mL 11     ibuprofen (ADVIL) 200 MG capsule Take  by mouth as needed.       minocycline (DYNACIN) 100 MG tablet Take 1 tablet by mouth 2 times daily. Per dermatology         ROS:    A 10-point review of systems was performed.  It is positive for that noted in the HPI and as seen below.  All other systems found to be negative.     Numbness in feet?  no   Calf pain with walking? no  Recent foot/ankle injury? no  Weight change  over past 12 months? 2#loss  Self perception as overweight? no  Recent flu-like  "symptoms? no  Joint pain other than feet ? Low back, shoulders, knees, wrists    EXAM:    Vitals: Resp 12   Ht 1.886 m (6' 2.25\")   Wt 72.6 kg (160 lb)   BMI 20.40 kg/m    BMI: Body mass index is 20.4 kg/m .  Height: 6' 2.25\"    Constitutional/ general:  Pt is in no apparent distress, appears well-nourished.  Cooperative with history and physical exam.     Vascular:  Pedal pulses are palpable bilaterally for both the DP and PT arteries.  CFT < 3 sec.  No edema.  Pedal hair growth noted.     Neuro:  Alert and oriented x 3. Coordinated gait.  Light touch sensation is intact to the L4, L5, S1 distributions. No obvious deficits.  No evidence of neurological-based weakness, spasticity, or contracture in the lower extremities.     Derm:  Localized erythema, edema, crust, tenderness along the latera skin fold of the right hallux.    Musculoskeletal:    Lower extremity muscle strength is normal.  Patient is ambulatory without an assistive device or brace .  No gross deformities.        Gonzalez Barlow DPM, FACFAS, MS    [facility] Department of Podiatry/Foot & Ankle Surgery            "

## 2019-06-20 NOTE — LETTER
"    6/20/2019         RE: Balaji Wolfe  [address] [place] [id number]        Dear Colleague,    Thank you for referring your patient, Balaji Wolfe, to the [facility]. Please see a copy of my visit note below.    ASSESSMENT/PLAN:    Encounter Diagnoses   Name Primary?     Toe pain, right Yes     Ingrowing nail, lateral edge, right hallux      The potential causes and nature of an ingrown toenail were discussed with the patient.  We reviewed the natural history/prognosis of the condition and potential risks if no treatment is provided.      Treatment options discussed included conservative management (oral antibiotics when coexisting infection, soaking of foot, the use of a toe spacer, adequate width shoes)  as well as surgical management (partial or total nail removal).  The pros and cons of both forms of treatment were reviewed.      After thorough discussion and answering all questions, the patient elected to a partial nail avulsion.  I discussed the option of permanent removal, if a recurrent problem and when not infected. I explained that applying the chemical (phenol) creates a chemical burn, kills tissue and this is not ideal when there is an infection.      Nail Avulsion Procedure  (non permanent removal)    The procedure was discussed with the patient, including risk of infection, abnormal nail regrowth, and possible need for a future nail procedure.  Post procedure home cares were explained. These cares are important for preventing infection and aiding in timely healing.   Verbal and written consent was obtained.   The site was marked and the \"Time Out\" called.     The base of the right hallux was injected with 2 cc of  2% Lidocaine plain.  The toe was then prepped with betadine solution.  A tourniquet was applied around the base of the toe for hemostasis.   Next the toe was checked for adequate anesthesia.  With the Pt comfortable, the lateral nail was freed from " the nail bed and marginal soft tissue attachments with a blunt instrument.  ( A nail splitter was then used to make a longitudinal cut 2mm from the lateral nail fold.  It was completed, atraumatically, under the eponychium with a Tatitlek blade. ) Next, it was firmly grasped with a hemostat and removed in total.      Silvadene ointment was applied to the nail bed, followed by a compressive dressing.  The tourniquet was removed.  The distal toe turned immediately pink.  The foot was kept elevated for several minutes.  The patient tolerated the procedure well.      Patient is instructed to watch for, and call if,  increasing redness, drainage, and pain after 2-3 days.  Post procedure instructions provided - handout given.      Body mass index is 20.4 kg/m .      Gonzalez Barlow DPM, FACTABATHA, MS    [place] [facility] of Podiatry/Foot & Ankle Surgery      ____________________________________________________________________    HPI:         Chief Complaint: right great toe pain; ingrown nail, right great toe  Onset of problem: 9 days  Pain/ discomfort is described as:  Dep ache when pressure is applied  Pain Ratin/10 at worst.  Frequency:  daily    The pain is exacerbated by pressure, socks, bumping, running.  Previous treatment: soaking, tea tree oil, Advil  He had a partial nail avulsion last September for this same problem    Patient Active Problem List   Diagnosis     Tree nut allergy     Past Surgical History:   Procedure Laterality Date     NOSE SURGERY      nasal fracture     Current Outpatient Medications   Medication Sig Dispense Refill     EPINEPHrine (EPIPEN 2-NAS) 0.3 MG/0.3ML injection Inject 0.3 mLs (0.3 mg) into the muscle once as needed for anaphylaxis 0.6 mL 11     ibuprofen (ADVIL) 200 MG capsule Take  by mouth as needed.       minocycline (DYNACIN) 100 MG tablet Take 1 tablet by mouth 2 times daily. Per dermatology         ROS:    A 10-point review of systems was performed.  It is positive for that  "noted in the HPI and as seen below.  All other systems found to be negative.     Numbness in feet?  no   Calf pain with walking? no  Recent foot/ankle injury? no  Weight change  over past 12 months? 2#loss  Self perception as overweight? no  Recent flu-like symptoms? no  Joint pain other than feet ? Low back, shoulders, knees, wrists    EXAM:    Vitals: Resp 12   Ht 1.886 m (6' 2.25\")   Wt 72.6 kg (160 lb)   BMI 20.40 kg/m     BMI: Body mass index is 20.4 kg/m .  Height: 6' 2.25\"    Constitutional/ general:  Pt is in no apparent distress, appears well-nourished.  Cooperative with history and physical exam.     Vascular:  Pedal pulses are palpable bilaterally for both the DP and PT arteries.  CFT < 3 sec.  No edema.  Pedal hair growth noted.     Neuro:  Alert and oriented x 3. Coordinated gait.  Light touch sensation is intact to the L4, L5, S1 distributions. No obvious deficits.  No evidence of neurological-based weakness, spasticity, or contracture in the lower extremities.     Derm:  Localized erythema, edema, crust, tenderness along the latera skin fold of the right hallux.    Musculoskeletal:    Lower extremity muscle strength is normal.  Patient is ambulatory without an assistive device or brace .  No gross deformities.        Gonzalez Barlow DPM, FACFAS, MS    [place] [facility] of Podiatry/Foot & Ankle Surgery              Again, thank you for allowing me to participate in the care of your patient.        Sincerely,        Gonzalez Barlow DPM    "

## 2019-12-08 ENCOUNTER — HEALTH MAINTENANCE LETTER (OUTPATIENT)
Age: 29
End: 2019-12-08

## 2023-03-23 ENCOUNTER — TELEPHONE (OUTPATIENT)
Dept: INTERNAL MEDICINE | Facility: CLINIC | Age: 33
End: 2023-03-23
Payer: COMMERCIAL

## 2023-03-23 NOTE — TELEPHONE ENCOUNTER
Mother (C2C) calling on behalf of patient to discuss patient's request of restarting ADHD medication. Patient last seen by PCP 12/9/2016.    RN advised patient schedule appt to re-establish care with a primary care provider of his choice to discuss his request for restarting ADHD medications. Mother agrees and will discuss with patient, patient will schedule an appt at a later time.

## 2023-11-27 ASSESSMENT — ENCOUNTER SYMPTOMS
WEAKNESS: 0
PARESTHESIAS: 0
CHILLS: 0
HEMATURIA: 0
DYSURIA: 0
DIZZINESS: 0
CONSTIPATION: 0
MYALGIAS: 0
NERVOUS/ANXIOUS: 1
NAUSEA: 0
SORE THROAT: 0
SHORTNESS OF BREATH: 0
EYE PAIN: 0
JOINT SWELLING: 0
FEVER: 0
PALPITATIONS: 1
ARTHRALGIAS: 1
DIARRHEA: 0
COUGH: 0
HEARTBURN: 0
ABDOMINAL PAIN: 0
HEADACHES: 1
HEMATOCHEZIA: 0
FREQUENCY: 0

## 2023-11-27 NOTE — COMMUNITY RESOURCES LIST (ENGLISH)
11/27/2023   [facility]  N/A  For questions about this resource list or additional care needs, please contact your primary care clinic or care manager.  Phone: [phone]   Email: N/A   Address: [address] [place] [address]   Hours: N/A        Food and Nutrition       Food pantry  1  [facility] Distance: 1.18 miles      Pickup   [address] [address]  Language: English  Hours: Sat 10:00 AM - 2:00 PM  Fees: Free   Phone: (633) 684-2914 Email: [email or web address] Website: [email or web address] [email or web address].org     2  [facility] Food Shelf Distance: 1.6 miles      Pickup   [address] [place] [address]  Language: English  Hours: Wed 11:00 AM - 2:00 PM , Sat 9:00 AM - 11:30 PM  Fees: Free   Phone: (979) 264-5214 Ext.205 Email: foodshelf@[facility].org Website: http://www.EcoGroomer.org     SNAP application assistance  3  [employer] - Office of Multicultural Services Distance: 1.78 miles      Phone/Virtual   [address] [phone]  Language: American Sign Language, Icelandic, Swedish, English, Croatian, French, Oromo, Cymro, Northern Irish, Slovak, Swahili, Guyanese, Kyrgyz  Hours: Mon - Tue 9:00 AM - 4:00 PM , Wed 10:00 AM - 5:00 PM , Thu - Fri 9:00 AM - 4:00 PM  Fees: Free   Phone: (408) 995-2331 Email: oms@[place]. Website: http://www.[facility]/residents/human-services/multi-cultural-services     4  [employer] [employer] [employer] (CLUES) [facility] Distance: 2.35 miles      In-Person   [address] [place] [address]  Language: English, Slovak  Hours: Mon - Fri 8:30 AM - 5:00 PM  Fees: Free   Phone: (334) 479-2581 Email: info@clues.org Website: http://www.clues.org/     Soup kitchen or free meals  5  [facility] - Loaves and Fishes Distance: 1.8 miles      Pickup   [address] [place] [address]  Language: English   Hours: Wed 5:00 PM - 6:00 PM  Fees: Free   Phone: (363) 326-6823 Email: office@My1login.Parakweet Website: https://R[employer]/     6  [facility] and [facility] Distance: 1.99 miles      In-Person   [address] [place] [address]  Language: English  Hours: Mon - Wed 12:00 PM - 1:00 PM  Fees: Free   Phone: (957) 441-7669 Email: liz@[facility].TechFaith.org Website: https://centralusa.[facility]XA.org/northern/[place]/          Important Numbers & Websites       Emergency Services   911  [facility] Services   311  Poison Control   (103) 399-5157  Suicide Prevention Lifeline   (963) 279-8409 (TALK)  Child Abuse Hotline   (212) 494-7253 (4-A-Child)  Sexual Assault Hotline   (562) 179-4380 (HOPE)  National Runaway Safeline   (997) 275-8579 (RUNAWAY)  All-Options Talkline   (862) 764-9848  Substance Abuse Referral   (309) 564-1804 (HELP)

## 2023-11-29 ENCOUNTER — OFFICE VISIT (OUTPATIENT)
Dept: FAMILY MEDICINE | Facility: CLINIC | Age: 33
End: 2023-11-29
Payer: COMMERCIAL

## 2023-11-29 VITALS
RESPIRATION RATE: 15 BRPM | OXYGEN SATURATION: 98 % | HEART RATE: 70 BPM | WEIGHT: 156 LBS | HEIGHT: 75 IN | DIASTOLIC BLOOD PRESSURE: 76 MMHG | SYSTOLIC BLOOD PRESSURE: 136 MMHG | TEMPERATURE: 98.6 F | BODY MASS INDEX: 19.4 KG/M2

## 2023-11-29 DIAGNOSIS — R41.840 INATTENTION: ICD-10-CM

## 2023-11-29 DIAGNOSIS — G89.29 CHRONIC PAIN OF BOTH KNEES: ICD-10-CM

## 2023-11-29 DIAGNOSIS — Z13.1 SCREENING FOR DIABETES MELLITUS: ICD-10-CM

## 2023-11-29 DIAGNOSIS — M25.561 CHRONIC PAIN OF BOTH KNEES: ICD-10-CM

## 2023-11-29 DIAGNOSIS — Z11.3 SCREEN FOR STD (SEXUALLY TRANSMITTED DISEASE): ICD-10-CM

## 2023-11-29 DIAGNOSIS — Z00.00 ENCOUNTER FOR ANNUAL PHYSICAL EXAM: Primary | ICD-10-CM

## 2023-11-29 DIAGNOSIS — F41.9 ANXIETY: ICD-10-CM

## 2023-11-29 DIAGNOSIS — Z13.220 SCREENING CHOLESTEROL LEVEL: ICD-10-CM

## 2023-11-29 DIAGNOSIS — M25.562 CHRONIC PAIN OF BOTH KNEES: ICD-10-CM

## 2023-11-29 PROCEDURE — 99214 OFFICE O/P EST MOD 30 MIN: CPT | Mod: 25

## 2023-11-29 PROCEDURE — 99385 PREV VISIT NEW AGE 18-39: CPT

## 2023-11-29 RX ORDER — ESCITALOPRAM OXALATE 10 MG/1
10 TABLET ORAL DAILY
Qty: 30 TABLET | Refills: 1 | Status: SHIPPED | OUTPATIENT
Start: 2023-11-29

## 2023-11-29 RX ORDER — MULTIVITAMIN WITH IRON
TABLET ORAL
COMMUNITY
Start: 2023-08-01

## 2023-11-29 ASSESSMENT — ENCOUNTER SYMPTOMS
MYALGIAS: 0
NAUSEA: 0
DYSURIA: 0
HEADACHES: 1
ARTHRALGIAS: 1
DIARRHEA: 0
FREQUENCY: 0
ABDOMINAL PAIN: 0
SORE THROAT: 0
CHILLS: 0
PALPITATIONS: 1
HEARTBURN: 0
COUGH: 0
SHORTNESS OF BREATH: 0
DIZZINESS: 0
PARESTHESIAS: 0
EYE PAIN: 0
HEMATURIA: 0
CONSTIPATION: 0
JOINT SWELLING: 0
NERVOUS/ANXIOUS: 1
HEMATOCHEZIA: 0
FEVER: 0
WEAKNESS: 0

## 2023-11-29 ASSESSMENT — PAIN SCALES - GENERAL: PAINLEVEL: NO PAIN (0)

## 2023-11-29 ASSESSMENT — ANXIETY QUESTIONNAIRES
3. WORRYING TOO MUCH ABOUT DIFFERENT THINGS: MORE THAN HALF THE DAYS
IF YOU CHECKED OFF ANY PROBLEMS ON THIS QUESTIONNAIRE, HOW DIFFICULT HAVE THESE PROBLEMS MADE IT FOR YOU TO DO YOUR WORK, TAKE CARE OF THINGS AT HOME, OR GET ALONG WITH OTHER PEOPLE: VERY DIFFICULT
2. NOT BEING ABLE TO STOP OR CONTROL WORRYING: MORE THAN HALF THE DAYS
5. BEING SO RESTLESS THAT IT IS HARD TO SIT STILL: MORE THAN HALF THE DAYS
7. FEELING AFRAID AS IF SOMETHING AWFUL MIGHT HAPPEN: SEVERAL DAYS
GAD7 TOTAL SCORE: 13
1. FEELING NERVOUS, ANXIOUS, OR ON EDGE: NEARLY EVERY DAY
GAD7 TOTAL SCORE: 13
6. BECOMING EASILY ANNOYED OR IRRITABLE: SEVERAL DAYS

## 2023-11-29 ASSESSMENT — PATIENT HEALTH QUESTIONNAIRE - PHQ9: 5. POOR APPETITE OR OVEREATING: MORE THAN HALF THE DAYS

## 2023-11-29 NOTE — PROGRESS NOTES
SUBJECTIVE:   Steven is a 33 year old, presenting for the following:  Physical and Medication Therapy Management        11/29/2023    12:46 PM   Additional Questions   Roomed by Justa MEJIA       Healthy Habits:     Getting at least 3 servings of Calcium per day:  Yes    Bi-annual eye exam:  Yes    Dental care twice a year:  NO    Sleep apnea or symptoms of sleep apnea:  None    Diet:  Regular (no restrictions)    Frequency of exercise:  2-3 days/week    Duration of exercise:  30-45 minutes    Taking medications regularly:  Yes    Medication side effects:  Not applicable    Additional concerns today:  Yes      Was on Concerta in middle school, but felt more antisocial and felt it messing with appetite. Had low appetite.    Anxiety has not had control on this. Through talkking with friends and therapist. Patient has an everyday anxiety. Worse with smaller moments/thoughts/feelings and day-to-day life. More anxiety than depression. Triggers would include needing to go to a larger party or seeing people he doesn't know (trouble leaving the house or the couch).  Feels like ADHD and anxiety are combined.  No suicidal ideation.    Sleep: rough at times. Has a cat that wakes him up. Tends to be a light sleeper. Wakes up and has trouble falling back asleep. Happens a few times per week. Warm milk/THC gummy can be helpful. Has chronic neck pain and soreness.  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   Just let go from [employer] ([employer] light bicycle spokes and wheels).  and marketing assistance, multiple roles. .Passionate about cycling.  Exercise: cycling is main form. Some form of weight lifting in basement. Dumbellls and squats.  Diet: chicken, pasta, rice. Trying to eat more veggies.Likes to make stir acuña.    Sexually active.    Today's PHQ-2 Score:       11/29/2023    12:33 PM   PHQ-2 ( 1999 [employer])   Q1: Little interest or pleasure in doing things 1   Q2: Feeling down, depressed or hopeless 1   PHQ-2 Score 2   Q1: Little  "interest or pleasure in doing things Several days   Q2: Feeling down, depressed or hopeless Several days   PHQ-2 Score 2     GAD7 score: 13       Social History     Tobacco Use    Smoking status: Some Days     Types: Cigarettes    Smokeless tobacco: Never    Tobacco comments:     \"occasional\" smoker (~ 1 pack every 1-1.5 weeks)   Substance Use Topics    Alcohol use: Yes     Alcohol/week: 0.0 standard drinks of alcohol     Comment: social beer             11/27/2023    10:04 AM   Alcohol Use   Prescreen: >3 drinks/day or >7 drinks/week? No          No data to display                Last PSA: No results found for: \"PSA\"      Reviewed and updated as needed this visit by clinical staff   Tobacco  Allergies  Meds              Reviewed and updated as needed this visit by Provider                     Review of Systems   Constitutional:  Negative for chills and fever.   HENT:  Positive for congestion and hearing loss. Negative for ear pain and sore throat.    Eyes:  Negative for pain and visual disturbance.   Respiratory:  Negative for cough and shortness of breath.    Cardiovascular:  Positive for palpitations. Negative for chest pain and peripheral edema.   Gastrointestinal:  Negative for abdominal pain, constipation, diarrhea, heartburn, hematochezia and nausea.   Genitourinary:  Negative for dysuria, frequency, genital sores, hematuria, impotence, penile discharge and urgency.   Musculoskeletal:  Positive for arthralgias. Negative for joint swelling and myalgias.   Skin:  Negative for rash.   Neurological:  Positive for headaches. Negative for dizziness, weakness and paresthesias.   Psychiatric/Behavioral:  Negative for mood changes. The patient is nervous/anxious.        OBJECTIVE:   /76 (BP Location: Right arm, Patient Position: Sitting, Cuff Size: Adult Regular)   Pulse 70   Temp 98.6  F (37  C) (Temporal)   Resp 15   Ht 1.895 m (6' 2.61\")   Wt 70.8 kg (156 lb)   SpO2 98%   BMI 19.71 kg/m  "     Physical Exam  GENERAL: healthy, alert and no distress  EYES: Eyes grossly normal to inspection, PERRL and conjunctivae and sclerae normal  HENT: ear canals and TM's normal, nose and mouth without ulcers or lesions  NECK: no adenopathy, no asymmetry, masses, or scars and thyroid normal to palpation  RESP: lungs clear to auscultation - no rales, rhonchi or wheezes  CV: regular rate and rhythm, normal S1 S2, no S3 or S4, no murmur, click or rub, no peripheral edema and peripheral pulses strong  ABDOMEN: soft, nontender, no hepatosplenomegaly, no masses and bowel sounds normal  MS: no gross musculoskeletal defects noted, no edema  SKIN: no suspicious lesions or rashes  NEURO: Normal strength and tone, mentation intact and speech normal  PSYCH: mentation appears normal, affect normal/bright    ASSESSMENT/PLAN:   (Z00.00) Encounter for annual physical exam  (primary encounter diagnosis)    (Z13.220) Screening cholesterol level  Plan: Lipid panel reflex to direct LDL Fasting    (F41.9) Anxiety  Chronic, uncontrolled  Plan: escitalopram (LEXAPRO) 10 MG tablet, TSH with         free T4 reflex, Adult Mental Health          Referral  Patient has issues with chronic anxiety that pertains to small events and social anxiety.  He also reports a history of an ADHD diagnosis during middle school for which she is interested in receiving treatment as an adult.  We discussed different medication options including nonstimulants versus SSRIs versus bupropion versus buspirone.  Patient had elected to try Lexapro.  Discussed potential side effects of Lexapro and onset of action as well as importance of taking daily.    (Z11.3) Screen for STD (sexually transmitted disease)  Plan: HIV Antigen Antibody Combo, NEISSERIA         GONORRHOEA PCR, CHLAMYDIA TRACHOMATIS PCR,         Treponema Abs w Reflex to RPR and Titer    (Z13.1) Screening for diabetes mellitus  Plan: Glucose    (R41.840) Inattention  Plan: Adult Mental Health   Referral    (M25.561,  M25.562,  G89.29) Chronic pain of both knees  Plan: Physical Therapy Referral  Patient has been coping with chronic knee pain bilaterally that is often worse on the left side.  It is not currently flaring up, but he reports it does flareup with cycling.  I did place a PT referral for him to see Andrew from [employer]    Patient has been advised of split billing requirements and indicates understanding: Yes      COUNSELING:   Reviewed preventive health counseling, as reflected in patient instructions       Regular exercise       Healthy diet/nutrition        He reports that he has been smoking cigarettes. He has never used smokeless tobacco.  Nicotine/Tobacco Cessation Plan:   Information offered: Patient not interested at this time          Terrance Payan NP  [facility]

## 2023-11-29 NOTE — COMMUNITY RESOURCES LIST (ENGLISH)
11/29/2023   [facility]  N/A  For questions about this resource list or additional care needs, please contact your primary care clinic or care manager.  Phone: [phone]   Email: N/A   Address: [address] [place] [address]   Hours: N/A        Food and Nutrition       Food pantry  1  [facility] Distance: 1.18 miles      Pickup   [address], [place] [address]  Language: English  Hours: Sat 10:00 AM - 2:00 PM  Fees: Free   Phone: (767) 740-1249 Email: [email or web address] Website: http://"[employer]."Nazara TechnologiesNor[facility].org     2  [facility] Food Shelf Distance: 1.6 miles      Pickup   [address] [place] [address]  Language: English  Hours: Wed 11:00 AM - 2:00 PM , Sat 9:00 AM - 11:30 PM  Fees: Free   Phone: (341) 383-5082 Ext.205 Email: foodshelf@[facility].org Website: http://www.[employer].org     SNAP application assistance  3  [facility] - Office of Multicultural Services Distance: 1.78 miles      Phone/Virtual   [address] [phone]  Language: American Sign Language, Faroese, Korean, English, Hungarian, Yoruba, Oromo, Haitian, Tongan, Estonian, Swahili, Bruneian, Spanish  Hours: Mon - Tue 9:00 AM - 4:00 PM , Wed 10:00 AM - 5:00 PM , Thu - Fri 9:00 AM - 4:00 PM  Fees: Free   Phone: (922) 166-5869 Email: oms@[place]. Website: http://www.[facility]/residents/human-services/multi-cultural-services     4  [employer]as Unidas En Servicio (CLUES) [facility] Distance: 2.3 miles      In-Person   [address] [place] [address]  Language: English, Estonian  Hours: Mon - Fri 8:30 AM - 5:00 PM  Fees: Free   Phone: (103) 266-3158 Email: info@clues.org Website: http://www.clues.org/     Soup kitchen or free meals  5  [facility] - Loaves and Fishes Distance: 1.8 miles      Pickup   [address] [place] [address]  Language: English   Hours: Wed 5:00 PM - 6:00 PM  Fees: Free   Phone: (370) 220-3837 Email: office@[place].[employer] Website: https://Eventstagr.am/     6  [facility] and [facility] Distance: 1.99 miles      In-Person   [address] [place] [address]  Language: English  Hours: Mon - Wed 12:00 PM - 1:00 PM  Fees: Free   Phone: (694) 787-8825 Email: liz@[facility].MetaIntell.org Website: https://centralusa.[facility] HospitalC[facility] Therapeutics.org/northern/[place]/          Important Numbers & Websites       Emergency Services   911  [facility] Services   311  Poison Control   (608) 760-5602  Suicide Prevention Lifeline   (135) 539-2107 (TALK)  Child Abuse Hotline   (468) 207-1984 (4-A-Child)  Sexual Assault Hotline   (820) 401-1456 (HOPE)  National Runaway Safeline   (529) 875-9653 (RUNAWAY)  All-Options Talkline   (585) 885-1331  Substance Abuse Referral   (983) 844-6533 (HELP)

## 2023-11-29 NOTE — COMMUNITY RESOURCES LIST (ENGLISH)
11/29/2023   [facility]  N/A  For questions about this resource list or additional care needs, please contact your primary care clinic or care manager.  Phone: [phone]   Email: N/A   Address: [address] [place] [address]   Hours: N/A        Food and Nutrition       Food pantry  1  [facility] Distance: 1.18 miles      Pickup   [address], [place] [address]  Language: English  Hours: Sat 10:00 AM - 2:00 PM  Fees: Free   Phone: (452) 181-4336 Email: [email or web address].Coherent Path Website: http://SampalRxAbimate.eeHoly [facility]emer [facility] System.org     2  [facility] Food Shelf Distance: 1.6 miles      Pickup   [address] [place] [address]  Language: English  Hours: Wed 11:00 AM - 2:00 PM , Sat 9:00 AM - 11:30 PM  Fees: Free   Phone: (679) 404-2939 Ext.205 Email: foodshelf@[facility].org Website: http://www.Copytele.org     SNAP application assistance  3  [facility] - Office of Multicultural Services Distance: 1.78 miles      Phone/Virtual   [address] [phone]  Language: American Sign Language, Polish, Albanian, English, Greenlandic, Romansh, Oromo, Venezuelan, St Helenian, Hebrew, Swahili, Kittitian, Slovenian  Hours: Mon - Tue 9:00 AM - 4:00 PM , Wed 10:00 AM - 5:00 PM , Thu - Fri 9:00 AM - 4:00 PM  Fees: Free   Phone: (205) 380-7946 Email: oms@[place]. Website: http://www.[facility]/residents/human-services/multi-cultural-services     4  [facility] [employer]as Unidas En Servicio (CLUES) [facility] Distance: 2.3 miles      In-Person   [address] [place] [address]  Language: English, Hebrew  Hours: Mon - Fri 8:30 AM - 5:00 PM  Fees: Free   Phone: (717) 331-5668 Email: info@clues.org Website: http://www.clues.org/     Soup kitchen or free meals  5  [facility] - Loaves and Fishes Distance: 1.8 miles      Pickup   [address] [place] [address]  Language: English   Hours: Wed 5:00 PM - 6:00 PM  Fees: Free   Phone: (317) 931-6221 Email: office@[employer].AgentPiggy Website: https://Skyepack/     6  [facility] and [facility] Distance: 1.99 miles      In-Person   [address] [place] [address]  Language: English  Hours: Mon - Wed 12:00 PM - 1:00 PM  Fees: Free   Phone: (733) 930-8534 Email: liz@[facility].Coin.org Website: https://centralusa.[facility]iscoPer[facility] Pixel.org/northern/[place]/          Important Numbers & Websites       Emergency Services   911  [facility] Services   311  Poison Control   (663) 893-4003  Suicide Prevention Lifeline   (428) 292-6031 (TALK)  Child Abuse Hotline   (617) 591-1601 (4-A-Child)  Sexual Assault Hotline   (475) 669-2105 (HOPE)  National Runaway Safeline   (355) 406-9922 (RUNAWAY)  All-Options Talkline   (920) 209-1022  Substance Abuse Referral   (750) 781-6238 (HELP)

## 2025-01-25 ENCOUNTER — HEALTH MAINTENANCE LETTER (OUTPATIENT)
Age: 35
End: 2025-01-25